# Patient Record
Sex: MALE | NOT HISPANIC OR LATINO | Employment: UNEMPLOYED | URBAN - NONMETROPOLITAN AREA
[De-identification: names, ages, dates, MRNs, and addresses within clinical notes are randomized per-mention and may not be internally consistent; named-entity substitution may affect disease eponyms.]

---

## 2022-02-08 ENCOUNTER — OFFICE VISIT (OUTPATIENT)
Dept: NEUROLOGY | Facility: CLINIC | Age: 72
End: 2022-02-08

## 2022-02-08 VITALS
SYSTOLIC BLOOD PRESSURE: 140 MMHG | BODY MASS INDEX: 42.56 KG/M2 | DIASTOLIC BLOOD PRESSURE: 82 MMHG | HEART RATE: 80 BPM | HEIGHT: 71 IN | OXYGEN SATURATION: 98 % | WEIGHT: 304 LBS | TEMPERATURE: 97 F

## 2022-02-08 DIAGNOSIS — G51.0 RIGHT-SIDED BELL'S PALSY: Primary | ICD-10-CM

## 2022-02-08 PROCEDURE — 99203 OFFICE O/P NEW LOW 30 MIN: CPT | Performed by: NURSE PRACTITIONER

## 2022-02-08 RX ORDER — PRAVASTATIN SODIUM 10 MG
10 TABLET ORAL DAILY
COMMUNITY

## 2022-02-08 RX ORDER — ASPIRIN 325 MG
81 TABLET ORAL DAILY
COMMUNITY

## 2022-02-08 RX ORDER — GABAPENTIN 600 MG/1
600 TABLET ORAL 3 TIMES DAILY
COMMUNITY

## 2022-02-08 RX ORDER — DICYCLOMINE HYDROCHLORIDE 10 MG/1
10 CAPSULE ORAL
COMMUNITY

## 2022-02-08 RX ORDER — ITRACONAZOLE 10 MG/ML
200 SOLUTION ORAL DAILY
COMMUNITY

## 2022-02-08 RX ORDER — CYCLOSPORINE 100 MG/1
100 CAPSULE, LIQUID FILLED ORAL 2 TIMES DAILY
COMMUNITY

## 2022-02-08 RX ORDER — TERAZOSIN 1 MG/1
1 CAPSULE ORAL NIGHTLY
COMMUNITY

## 2022-02-08 RX ORDER — FOLIC ACID 1 MG/1
0.5 TABLET ORAL DAILY
COMMUNITY

## 2022-02-08 RX ORDER — MULTIPLE VITAMINS W/ MINERALS TAB 9MG-400MCG
1 TAB ORAL DAILY
COMMUNITY

## 2022-02-08 RX ORDER — BUPROPION HYDROCHLORIDE 100 MG/1
100 TABLET ORAL 2 TIMES DAILY
COMMUNITY

## 2022-02-08 RX ORDER — FAMOTIDINE 10 MG
10 TABLET ORAL 2 TIMES DAILY
COMMUNITY

## 2022-02-08 RX ORDER — ALOGLIPTIN 12.5 MG/1
TABLET, FILM COATED ORAL
COMMUNITY

## 2022-02-08 RX ORDER — BUMETANIDE 1 MG/1
1 TABLET ORAL DAILY
COMMUNITY

## 2022-02-08 RX ORDER — ERYTHROMYCIN 5 MG/G
OINTMENT OPHTHALMIC NIGHTLY
COMMUNITY

## 2022-02-08 RX ORDER — MYCOPHENOLATE MOFETIL 200 MG/ML
POWDER, FOR SUSPENSION ORAL 2 TIMES DAILY
COMMUNITY

## 2022-02-08 NOTE — PATIENT INSTRUCTIONS
Bell Palsy, Adult    Bell palsy is a short-term inability to move muscles in part of the face. The inability to move (paralysis) results from inflammation or compression of the facial nerve, which travels along the skull and under the ear to the side of the face (7th cranial nerve). This nerve is responsible for facial movements that include blinking, closing the eyes, smiling, and frowning.  What are the causes?  The exact cause of this condition is not known. It may be caused by an infection from a virus, such as the chickenpox (herpes zoster), Hussein-Barr, or mumps virus.  What increases the risk?  You are more likely to develop this condition if:  · You are pregnant.  · You have diabetes.  · You have had a recent infection in your nose, throat, or airways (upper respiratory infection).  · You have a weakened body defense system (immune system).  · You have had a facial injury, such as a fracture.  · You have a family history of Bell palsy.  What are the signs or symptoms?  Symptoms of this condition include:  · Weakness on one side of the face.  · Drooping eyelid and corner of the mouth.  · Excessive tearing in one eye.  · Difficulty closing the eyelid.  · Dry eye.  · Drooling.  · Dry mouth.  · Changes in taste.  · Change in facial appearance.  · Pain behind one ear.  · Ringing in one or both ears.  · Sensitivity to sound in one ear.  · Facial twitching.  · Headache.  · Impaired speech.  · Dizziness.  · Difficulty eating or drinking.  Most of the time, only one side of the face is affected. Rarely, Bell palsy affects the whole face.  How is this diagnosed?  This condition is diagnosed based on:  · Your symptoms.  · Your medical history.  · A physical exam.  You may also have to see health care providers who specialize in disorders of the nerves (neurologist) or diseases and conditions of the eye (ophthalmologist). You may have tests, such as:  · A test to check for nerve damage (electromyogram).  · Imaging  studies, such as CT or MRI scans.  · Blood tests.  How is this treated?  This condition affects every person differently. Sometimes symptoms go away without treatment within a couple weeks. If treatment is needed, it varies from person to person. The goal of treatment is to reduce inflammation and protect the eye from damage. Treatment for Bell palsy may include:  · Medicines, such as:  ? Steroids to reduce swelling and inflammation.  ? Antiviral drugs.  ? Pain relievers, including aspirin, acetaminophen, or ibuprofen.  · Eye drops or ointment to keep your eye moist.  · Eye protection, if you cannot close your eye.  · Exercises or massage to regain muscle strength and function (physical therapy).  Follow these instructions at home:    · Take over-the-counter and prescription medicines only as told by your health care provider.  · If your eye is affected:  ? Keep your eye moist with eye drops or ointment as told by your health care provider.  ? Follow instructions for eye care and protection as told by your health care provider.  · Do any physical therapy exercises as told by your health care provider.  · Keep all follow-up visits as told by your health care provider. This is important.  Contact a health care provider if:  · You have a fever.  · Your symptoms do not get better within 2-3 weeks, or your symptoms get worse.  · Your eye is red, irritated, or painful.  · You have new symptoms.  Get help right away if:  · You have weakness or numbness in a part of your body other than your face.  · You have trouble swallowing.  · You develop neck pain or stiffness.  · You develop dizziness or shortness of breath.  Summary  · Bell palsy is a short-term inability to move muscles in part of the face. The inability to move (paralysis) results from inflammation or compression of the facial nerve.  · This condition affects every person differently. Sometimes symptoms go away without treatment within a couple weeks.  · If  treatment is needed, it varies from person to person. The goal of treatment is to reduce inflammation and protect the eye from damage.  · Contact your health care provider if your symptoms do not get better within 2-3 weeks, or your symptoms get worse.  This information is not intended to replace advice given to you by your health care provider. Make sure you discuss any questions you have with your health care provider.  Document Revised: 11/30/2018 Document Reviewed: 02/20/2018  Elsevier Patient Education © 2021 [employer]

## 2022-02-08 NOTE — PROGRESS NOTES
New Neurology Patient Office Visit      Patient Name: Kael aDmon    Referring Physician: Teo Martin III,*    Chief Complaint:    Chief Complaint   Patient presents with   • Advice Only     Pt in office for bells palsy        History of Present Illness: Kael Damon is a 71 y.o. male who is here today to establish care with Neurology. He was referred after suffering episode of Bell's palsy in November. He presented with acute right-sided facial weakness. Per review of PCP note, he did receive prednisone and valacyclovir, underwent head CT which was negative for acute process. He denies any other concerning symptoms at time of onset, including weakness in extremities, difficulty speaking, or any focal neurological symptoms.  He is following with Dr. Elton Llamas ophthalmology ([place]), he is using Emycin eye ointment to alleviate redness by his report. He denies any drainage.   He did undergo heart transplant in 1998, he continues on CellCept and and Neoral. He has had multiple ablations and cardiac catheterizations, he denies any pacemaker or defibrillator placement.     The following portions of the patient's history were reviewed and updated as appropriate: allergies, current medications, past family history, past medical history, past social history, past surgical history and problem list.    Subjective      Review of Systems:   Review of Systems   HENT: Negative for ear pain, hearing loss and trouble swallowing.    Eyes: Positive for photophobia, pain and redness. Negative for discharge, itching and visual disturbance.   Neurological: Negative for weakness.   All other systems reviewed and are negative.    Past Medical History: History reviewed. No pertinent past medical history.    Past Surgical History: History reviewed. No pertinent surgical history.    Family History: History reviewed. No pertinent family history.    Social History:   Social History     Socioeconomic History   • Marital  status:      Medications:     Current Outpatient Medications:   •  Alogliptin Benzoate 12.5 MG tablet, Take  by mouth., Disp: , Rfl:   •  aspirin 325 MG tablet, Take 81 mg by mouth Daily., Disp: , Rfl:   •  bumetanide (BUMEX) 1 MG tablet, Take 1 mg by mouth Daily., Disp: , Rfl:   •  buPROPion (WELLBUTRIN) 100 MG tablet, Take 100 mg by mouth 2 (Two) Times a Day., Disp: , Rfl:   •  Calcium-Cholecalciferol-Zinc (Viactiv Calcium Immune) 650-20-5.5 MG-MCG-MG chewable tablet, Chew., Disp: , Rfl:   •  cycloSPORINE modified (NEORAL) 100 MG capsule, Take 100 mg by mouth 2 (Two) Times a Day., Disp: , Rfl:   •  dicyclomine (BENTYL) 10 MG capsule, Take 10 mg by mouth 4 (Four) Times a Day Before Meals & at Bedtime., Disp: , Rfl:   •  erythromycin (ROMYCIN) 5 MG/GM ophthalmic ointment, Every Night., Disp: , Rfl:   •  famotidine (PEPCID) 10 MG tablet, Take 10 mg by mouth 2 (Two) Times a Day., Disp: , Rfl:   •  folic acid (FOLVITE) 0.5 MG half tablet, Take 0.5 mg by mouth Daily., Disp: , Rfl:   •  gabapentin (NEURONTIN) 600 MG tablet, Take 600 mg by mouth 3 (Three) Times a Day., Disp: , Rfl:   •  insulin aspart (novoLOG) 100 UNIT/ML injection, Inject  under the skin into the appropriate area as directed 3 (Three) Times a Day Before Meals., Disp: , Rfl:   •  itraconazole (SPORANOX) 10 MG/ML solution, Take 200 mg by mouth Daily., Disp: , Rfl:   •  multivitamin with minerals tablet tablet, Take 1 tablet by mouth Daily., Disp: , Rfl:   •  mycophenolate (CELLCEPT) 200 MG/ML suspension, Take  by mouth 2 (Two) Times a Day., Disp: , Rfl:   •  pravastatin (PRAVACHOL) 10 MG tablet, Take 10 mg by mouth Daily., Disp: , Rfl:   •  terazosin (HYTRIN) 1 MG capsule, Take 1 mg by mouth Every Night., Disp: , Rfl:   •  vitamin D3 125 MCG (5000 UT) capsule capsule, Take 5,000 Units by mouth Daily., Disp: , Rfl:     Allergies:   Allergies   Allergen Reactions   • Anticoagulant Cit Dext [Acd Formula A] Itching   • Ibuprofen Itching       Objective  "    Physical Exam:  Vital Signs:   Vitals:    02/08/22 1332   BP: 140/82   Pulse: 80   Temp: 97 °F (36.1 °C)   SpO2: 98%   Weight: (!) 138 kg (304 lb)   Height: 180.3 cm (71\")   PainSc: 0-No pain       Physical Exam  Vitals and nursing note reviewed. Exam conducted with a chaperone present ( wife).   Eyes:      Extraocular Movements: EOM normal.      Pupils: Pupils are equal, round, and reactive to light.   Neck:      Vascular: No carotid bruit.   Cardiovascular:      Rate and Rhythm: Regular rhythm.      Pulses: Normal pulses.      Heart sounds: Murmur heard.       Pulmonary:      Effort: Pulmonary effort is normal.      Breath sounds: Normal breath sounds.   Skin:     General: Skin is warm.   Neurological:      Mental Status: He is oriented to person, place, and time.      Coordination: Romberg Test normal.      Deep Tendon Reflexes: Strength normal.   Psychiatric:         Mood and Affect: Mood normal.         Speech: Speech normal.         Behavior: Behavior normal.         Neurologic Exam     Mental Status   Oriented to person, place, and time.   Attention: normal. Concentration: normal.   Speech: speech is normal   Level of consciousness: alert  Normal comprehension.     Cranial Nerves     CN II   Visual fields full to confrontation.     CN III, IV, VI   Pupils are equal, round, and reactive to light.  Extraocular motions are normal.   CN III: no CN III palsy  CN VI: no CN VI palsy    CN V   Facial sensation intact.     CN VII   Right facial weakness: peripheral  Left facial weakness: none    CN VIII   Hearing: intact    CN IX, X   CN IX normal.   CN X normal.     CN XI   CN XI normal.     CN XII   CN XII normal.     Motor Exam   Muscle bulk: normal  Overall muscle tone: normal  Right arm pronator drift: absent  Left arm pronator drift: absent    Strength   Strength 5/5 throughout.     Sensory Exam   Light touch normal.     Gait, Coordination, and Reflexes     Gait  Gait: non-neurologic (using " cane)    Coordination   Romberg: negative    Tremor   Resting tremor: absent     Results Review:   I reviewed the patient's new clinical results.  I have reviewed the patient's other medical records to include, labs, radiology and referrals.     Assessment / Plan      Assessment/Plan:   Diagnoses and all orders for this visit:    1. Right-sided Bell's palsy (Primary)    Discussed Bell's palsy and I have explained that Bell's palsy is nearly always due to self-limited viral infection, likely due to his immunocompromise status post heart transplant. I explained that he has already received steroid and antiviral medication and that further treatment is symptomatic. I have encouraged him to continue to follow with his ophthalmologist Dr. Llamas. I explained that given his weakness extending into forehead, I believe that stroke is unlikely. I have offered MRI today for further evaluation, patient declined at this time and I think this is reasonable. I reviewed symptoms of stroke and encouraged him to report to ER immediately if he ever experiences BE FAST symptoms. He will continue with ophthalmic ointment and eye patching as directed by his ophthalmologist.  Bell's palsy is characterized by facial drooping on the affected half, due to malfunction of the facial nerve which controls the muscle of the face. The facial nerves control a number of functions, such as blinking and closing the eyes, smiling, frowning, lacrimation, salivation, flaring nostrils and raising eyebrows. They also innervate the stapedial muscles of the middle ear and carry taste sensations from the anterior two-thirds of the tongue. Patients with Bell's palsy may present with hyperacusis or loss of taste sensation in the anterior 2/3 of the tongue.  Most people with Bell's palsy start to regain normal facial function within 3 weeks-even those who do not receive treatment. The patients who regain movement within the first two weeks nearly always remit  entirely. When remission does not occur until the third week or later, around 9% of the patients develop sequelae. Major complications of the condition are chronic loss of taste (ageusia), facial pain and corneal infections. To prevent the latter, the eyes may be protected by taped shut during sleep and for rest periods, and tear-like eye drops or eye ointments may be recommended, especially for cases with complete paralysis. Where the eye does not close completely, the blink reflex is also affected, and care must be taken to protect the eye from injury.  Another complication can occur in case of incomplete or erroneous regeneration of the damaged facial nerve known as synkinesis. For example, when the person closes the eye, the corner of the mouth lifts involuntarily.  Other complications such as facial contracture, tinnitus and/or hearing loss or Crocodile tear syndrome. This is also called gustatolacrimal reflex or Bogorad's Syndrome and involves the sufferer shedding tears while eating. This is thought to be due to faulty regeneration of the facial nerve, a branch of which controls the lacrimal and salivary glands. Gustatorial sweating can also occur.    Follow Up:   Return if symptoms worsen or fail to improve.     Mya Da Silva [facility]   AS THE PROVIDER, I PERSONALLY WORE PPE DURING ENTIRE FACE TO FACE ENCOUNTER IN CLINIC WITH THE PATIENT. PATIENT ALSO WORE PPE DURING ENTIRE FACE TO FACE ENCOUNTER EXCEPT FOR A MAX OF 30 SECONDS DURING NEUROLOGICAL EVALUATION OF CRANIAL NERVES AND THEN MASK WAS PLACED BACK OVER PATIENT FACE FOR REMAINDER OF VISIT. I WASHED MY HANDS BEFORE AND AFTER VISIT.    Please note that portions of this note may have been completed with a voice recognition program. Efforts were made to edit the dictations, but occasionally words are mistranscribed.